# Patient Record
Sex: FEMALE | Race: WHITE | NOT HISPANIC OR LATINO | Employment: OTHER | URBAN - METROPOLITAN AREA
[De-identification: names, ages, dates, MRNs, and addresses within clinical notes are randomized per-mention and may not be internally consistent; named-entity substitution may affect disease eponyms.]

---

## 2017-01-18 ENCOUNTER — DOCUMENTATION (OUTPATIENT)
Dept: PHYSICAL THERAPY | Facility: CLINIC | Age: 77
End: 2017-01-18

## 2017-01-18 NOTE — PROGRESS NOTES
Discharge Summary  Discharge Summary from Physical Therapy Report      Dates  PT visit: 07/27/2016 to 10/6/2016  Number of Visits: 17     Discharge Status of Patient: Patient strength and function improved with PT.  She was still experiencing some discomfort but it was tolerable. MD discharged from PT    Goals: All Met    Discharge Plan: Continue with current home exercise program as instructed    Comments She should continue to improve with time.    Date of Discharge 11/30/2016        Luciano Louis, PT  Physical Therapist

## 2019-02-27 PROBLEM — J06.9 VIRAL URI WITH COUGH: Status: ACTIVE | Noted: 2019-02-27

## 2019-09-09 ENCOUNTER — TRANSCRIBE ORDERS (OUTPATIENT)
Dept: PHYSICAL THERAPY | Facility: CLINIC | Age: 79
End: 2019-09-09

## 2019-09-09 ENCOUNTER — OFFICE VISIT (OUTPATIENT)
Dept: PHYSICAL THERAPY | Facility: CLINIC | Age: 79
End: 2019-09-09

## 2019-09-09 DIAGNOSIS — M50.30 DDD (DEGENERATIVE DISC DISEASE), CERVICAL: Primary | ICD-10-CM

## 2019-09-09 DIAGNOSIS — M54.2 CERVICAL PAIN: Primary | ICD-10-CM

## 2019-09-09 PROCEDURE — 97140 MANUAL THERAPY 1/> REGIONS: CPT | Performed by: PHYSICAL THERAPIST

## 2019-09-09 PROCEDURE — G0283 ELEC STIM OTHER THAN WOUND: HCPCS | Performed by: PHYSICAL THERAPIST

## 2019-09-09 PROCEDURE — 97035 APP MDLTY 1+ULTRASOUND EA 15: CPT | Performed by: PHYSICAL THERAPIST

## 2019-09-09 PROCEDURE — 97162 PT EVAL MOD COMPLEX 30 MIN: CPT | Performed by: PHYSICAL THERAPIST

## 2019-09-09 NOTE — PROGRESS NOTES
Physical Therapy Initial Evaluation and Plan of Care        Patient: Maria Luisa Wilson   : 1940  Diagnosis/ICD-10 Code:  Cervical pain [M54.2]  Referring practitioner: No ref. provider found  Date of Initial Visit: 2019  Today's Date: 2019  Patient seen for 1 sessions           Subjective Evaluation    History of Present Illness  Mechanism of injury: About 1 year ago started experiencing right side neck pain, progressed to the left shoulder.  In the past couple months pain has progressed down the left UE.    Subjective comment: Neck pain with left UE radiculopathy  Patient Occupation: N/A Quality of life: good    Pain  Current pain ratin  At best pain ratin  At worst pain ratin  Location: Bilateral posterior cervical spine pain, radiating across the left upper trap to lateral arm.  The arm is more constant.  Limited cerivcal ROM in all planes of motion. No paraesthesia or anaesthesia.  Relieving factors: rest and support  Aggravating factors: movement, repetitive movement, sleeping and outstretched reach  Progression: worsening    Hand dominance: right    Diagnostic Tests  X-ray: abnormal (DDD)             Objective       Postural Observations  Seated posture: fair  Standing posture: fair    Additional Postural Observation Details  Forward head, rounded shoulder posture.    Tenderness   Cervical Spine   Tenderness in the facet joint, left transverse process and right transverse process.     Additional Tenderness Details  Hypomobility of all facets left > right.    Neurological Testing     Sensation   Cervical/Thoracic   Left   Intact: light touch, pin prick and sharp/dull discrimination    Right   Intact: light touch, pin prick and sharp/dull discrimination    Reflexes   Left   Biceps (C5/C6): normal (2+)  Brachioradialis (C6): normal (2+)  Triceps (C7): normal (2+)    Right   Biceps (C5/C6): normal (2+)  Brachioradialis (C6): normal (2+)  Triceps (C7): normal (2+)    Active Range of Motion    Cervical/Thoracic Spine   Cervical    Flexion: 58 degrees   Extension: 0 degrees with pain  Left lateral flexion: 18 degrees with pain  Right lateral flexion: 5 degrees with pain  Left rotation: 15 degrees with pain  Right rotation: 22 degrees with pain  Left Shoulder   Normal active range of motion    Right Shoulder   Normal active range of motion    Strength/Myotome Testing   Cervical Spine     Left   Normal strength    Right   Normal strength    Left Wrist/Hand      (2nd hand position)   lbs: 41    Right Wrist/Hand      (2nd hand position)   lbs: 30    Tests   Cervical     Left   Positive Spurling's sign (Neck pain only).   Negative brachial plexus traction.     Right   Positive Spurling's sign.   Negative brachial plexus traction.     Additional Tests Details  Cervical Compression=increases neck pain  Cervical Distraction=decreases pain         Assessment & Plan     Assessment  Impairments: abnormal or restricted ROM, activity intolerance and pain with function  Other impairment: Neck Disability score 42  Assessment details: 79 year old female with a 1 year history of neck pain and loss of ROM, worsening with time. Has radiating symptoms to left UE (above the elbow).  Neurological exam is normal today. Severe hypomobility of the facet joints left worse than right. Signs and symptoms of severe DDD/DJD of cervical spine. This negatively affecting her function and daily activities.  Prognosis: good  Prognosis details: Responded well to manual traction and modalities today.  Functional Limitations: uncomfortable because of pain and reaching overhead  Goals  Plan Goals: STG to be met in 4 weeks  1.  Neck disability score improves from 42 to 35.  2.  Cervical rotation ROM improves to 40 degrees bilaterally.  3.  Resolve posterior headaches.  LTG to be met in 12 weeks  1.  Pt is independent with HEP.  2.  Function improves so that Neck Disability score improves to 22 or less  3.  Pt is able to back car up  less discomfort.  4.  Resolve all radiating discomfort.    Plan  Therapy options: will be seen for skilled physical therapy services  Planned modality interventions: high voltage pulsed current (pain management), traction, ultrasound and thermotherapy (hydrocollator packs)  Other planned modality interventions: Dry Needling.  Planned therapy interventions: joint mobilization, functional ROM exercises, strengthening, stretching, therapeutic activities, spinal/joint mobilization, soft tissue mobilization, postural training, manual therapy and neuromuscular re-education  Frequency: 2x week  Duration in weeks: 12        Manual Therapy:    16     mins  67210;  Therapeutic Exercise:         mins  32935;     Neuromuscular Mily:        mins  84690;    Therapeutic Activity:          mins  67707;     Gait Training:           mins  94037;     Ultrasound:     13     mins  84241;    Electrical Stimulation:    20     mins  22499 ( );  Iontophoresis          mins 29995   Traction          mins  62185  Fluidotherapy          mins  08073  Dry Needling          mins self-pay  Traction          mins  64923    Timed Treatment:   29   mins   Total Treatment:     75   mins    PT SIGNATURE: Luciano Louis, PT   DATE TREATMENT INITIATED: 9/9/2019    Medicare Initial Certification  Certification Period: 12/8/2019  I certify that the therapy services are furnished while this patient is under my care.  The services outlined above are required by this patient, and will be reviewed every 90 days.     PHYSICIAN: _______________________________________________________     Girma Wilson MD        DATE: ___________________________________________________________    Please sign and return via fax to [phone].. Thank you, [facility].

## 2019-09-13 ENCOUNTER — OFFICE VISIT (OUTPATIENT)
Dept: PHYSICAL THERAPY | Facility: CLINIC | Age: 79
End: 2019-09-13

## 2019-09-13 DIAGNOSIS — M54.2 CERVICAL PAIN: Primary | ICD-10-CM

## 2019-09-13 PROCEDURE — G0283 ELEC STIM OTHER THAN WOUND: HCPCS | Performed by: PHYSICAL THERAPIST

## 2019-09-13 PROCEDURE — 97035 APP MDLTY 1+ULTRASOUND EA 15: CPT | Performed by: PHYSICAL THERAPIST

## 2019-09-13 PROCEDURE — 97140 MANUAL THERAPY 1/> REGIONS: CPT | Performed by: PHYSICAL THERAPIST

## 2019-09-16 NOTE — PROGRESS NOTES
Subjective   Maria Luisa Wilson reports: last treatment has moved the pain up the arm, stops about mid left humerus now, was going to the wrist. Left neck pain is not as bad, but still cannot move the neck much better.    Objective   PALPATION: severe hypomobility of all facets joint, tender on the left  TEST: cervical distraction decreases neck pain and left UE pain.  See Exercise, Manual, and Modality Logs for complete treatment.       Assessment/Plan  Cervical spondylosis worse to the left with radiating symptoms, improving with rehab.  Progress per Plan of Care           Manual Therapy:    18     mins  70739;  Therapeutic Exercise:         mins  41360;     Neuromuscular Mily:        mins  69377;    Therapeutic Activity:          mins  19849;     Gait Training:           mins  13320;     Ultrasound:     13     mins  77558;   Iontophoresis          mins  66342   Electrical Stimulation:    20     mins  73283 ( );  Dry Needling          mins self-pay  Fluidotherapy          mins 86110  Traction          mins 87257  Paraffin:          mins  18467    Timed Treatment:   31   mins   Total Treatment:     51   mins    Luciano Louis, PT  Physical Therapist

## 2019-09-17 ENCOUNTER — OFFICE VISIT (OUTPATIENT)
Dept: PHYSICAL THERAPY | Facility: CLINIC | Age: 79
End: 2019-09-17

## 2019-09-17 DIAGNOSIS — M54.2 CERVICAL PAIN: Primary | ICD-10-CM

## 2019-09-17 PROCEDURE — G0283 ELEC STIM OTHER THAN WOUND: HCPCS | Performed by: PHYSICAL THERAPIST

## 2019-09-17 PROCEDURE — 97035 APP MDLTY 1+ULTRASOUND EA 15: CPT | Performed by: PHYSICAL THERAPIST

## 2019-09-17 PROCEDURE — 97140 MANUAL THERAPY 1/> REGIONS: CPT | Performed by: PHYSICAL THERAPIST

## 2019-09-17 NOTE — PROGRESS NOTES
Physical Therapy Daily Progress Note        Patient: Maria Luisa Wilson   : 1940  Diagnosis/ICD-10 Code:  Cervical pain [M54.2]  Referring practitioner: Girma Wilson MD  Date of Initial Visit: Type: THERAPY  Noted: 2019  Today's Date: 2019  Patient seen for 3 sessions           Subjective   Maria Luisa Wilson reports: the left shoulder/upper arm only hurts now when she moves the shoulder, a grinding is in the shoulder.  Neck still hurts to turn it.    Objective   SHOULDER EXAM: impingement test +, RTC intact, tender at subacromial bursa  CERVICAL: left upper to lower cervical facets tender and hypomobile  See Exercise, Manual, and Modality Logs for complete treatment.       Assessment/Plan  Better ROM of cervical spine after treatment than prior.  Mild bursitis/tendinitis left shoulder, the pain radiating down arm at rest is probably coming from cervical spine. Pain with movement probably the shoulder joint.  Progress per Plan of Care and Progress strengthening /stabilization /functional activity           Manual Therapy:    16     mins  43167;  Therapeutic Exercise:         mins  19265;     Neuromuscular Mily:        mins  27755;    Therapeutic Activity:          mins  73729;     Gait Training:           mins  24756;     Ultrasound:     13     mins  42303;    Electrical Stimulation:    20     mins  51542 ( );  Iontophoresis          mins 13060   Traction          mins  90171  Fluidotherapy          mins  24318  Dry Needling          mins self-pay  Paraffin          mins  39590    Timed Treatment:   29   mins   Total Treatment:     49   mins    Luciano Louis, PT  Physical Therapist

## 2019-09-20 ENCOUNTER — TREATMENT (OUTPATIENT)
Dept: PHYSICAL THERAPY | Facility: CLINIC | Age: 79
End: 2019-09-20

## 2019-09-20 DIAGNOSIS — M54.2 CERVICAL PAIN: Primary | ICD-10-CM

## 2019-09-20 PROCEDURE — G0283 ELEC STIM OTHER THAN WOUND: HCPCS | Performed by: PHYSICAL THERAPIST

## 2019-09-20 PROCEDURE — 97140 MANUAL THERAPY 1/> REGIONS: CPT | Performed by: PHYSICAL THERAPIST

## 2019-09-20 PROCEDURE — 97035 APP MDLTY 1+ULTRASOUND EA 15: CPT | Performed by: PHYSICAL THERAPIST

## 2019-09-23 NOTE — PROGRESS NOTES
Physical Therapy Daily Progress Note        Patient: Maria Luisa Wilson   : 1940  Diagnosis/ICD-10 Code:  Cervical pain [M54.2]  Referring practitioner: Girma Wilson MD  Date of Initial Visit: Type: THERAPY  Noted: 2019  Today's Date: 2019  Patient seen for 4 sessions           Subjective   Maria Luisa Wilson reports: feels her arm is some better but the neck is only slightly better. States her pain was about 8/10 when she started PT, now it is 7/10    Objective   PALPATION: tender and hypomobile left cervical facets  CROM: SB left 20, right 10.  Rotation left 25, right 35    See Exercise, Manual, and Modality Logs for complete treatment.       Assessment/Plan  Some improvement in ROM, but all facets area extremely hypomobile.    Progress per Plan of Care           Manual Therapy:    24     mins  14589;  Therapeutic Exercise:         mins  77502;     Neuromuscular Mily:        mins  05572;    Therapeutic Activity:          mins  85711;     Gait Training:           mins  76679;     Ultrasound:     15     mins  02120;    Electrical Stimulation:    20     mins  26010 ( );  Iontophoresis          mins 46428   Traction          mins  50382  Fluidotherapy          mins  84160  Dry Needling          mins self-pay  Paraffin          mins  74503    Timed Treatment:   39   mins   Total Treatment:     59   mins    Luciano Louis PT  Physical Therapist

## 2019-09-25 ENCOUNTER — TREATMENT (OUTPATIENT)
Dept: PHYSICAL THERAPY | Facility: CLINIC | Age: 79
End: 2019-09-25

## 2019-09-25 DIAGNOSIS — M54.2 CERVICAL PAIN: Primary | ICD-10-CM

## 2019-09-25 PROCEDURE — 97140 MANUAL THERAPY 1/> REGIONS: CPT | Performed by: PHYSICAL THERAPIST

## 2019-09-25 PROCEDURE — 97035 APP MDLTY 1+ULTRASOUND EA 15: CPT | Performed by: PHYSICAL THERAPIST

## 2019-09-25 PROCEDURE — G0283 ELEC STIM OTHER THAN WOUND: HCPCS | Performed by: PHYSICAL THERAPIST

## 2019-09-27 NOTE — PROGRESS NOTES
Physical Therapy Daily Progress Note        Patient: Maria Luisa Wilson   : 1940  Diagnosis/ICD-10 Code:  Cervical pain [M54.2]  Referring practitioner: Girma Wilson MD  Date of Initial Visit: Type: THERAPY  Noted: 2019  Today's Date: 2019  Patient seen for 5 sessions           Subjective   Maria Luisa Wilson reports: last treatment helped a lot.  Less pain and can turn to the left better. Right now hurts more than the left, and feels more stiff to the right.    Objective   CROM:  Rotation left 40, right 42  PALPATION: tender bilateral paraspinal muscles and scalenes.  Hypomobility of all cervical facets  See Exercise, Manual, and Modality Logs for complete treatment.       Assessment/Plan  Pt responded very well to manual treatment including IDN.    Progress per Plan of Care           Manual Therapy:    26     mins  28866;  Therapeutic Exercise:         mins  28850;     Neuromuscular Mily:        mins  50174;    Therapeutic Activity:          mins  45163;     Gait Training:           mins  97601;     Ultrasound:     13     mins  24063;    Electrical Stimulation:    20     mins  82170 ( );  Iontophoresis          mins 63549   Traction          mins  22245  Fluidotherapy          mins  22202  Dry Needling          mins self-pay  Paraffin          mins  28661    Timed Treatment:   39   mins   Total Treatment:     59   mins    Luciano Louis, PT  Physical Therapist

## 2019-10-01 ENCOUNTER — TREATMENT (OUTPATIENT)
Dept: PHYSICAL THERAPY | Facility: CLINIC | Age: 79
End: 2019-10-01

## 2019-10-01 DIAGNOSIS — M54.2 CERVICAL PAIN: Primary | ICD-10-CM

## 2019-10-01 PROCEDURE — 97035 APP MDLTY 1+ULTRASOUND EA 15: CPT | Performed by: PHYSICAL THERAPIST

## 2019-10-01 PROCEDURE — 97140 MANUAL THERAPY 1/> REGIONS: CPT | Performed by: PHYSICAL THERAPIST

## 2019-10-01 PROCEDURE — G0283 ELEC STIM OTHER THAN WOUND: HCPCS | Performed by: PHYSICAL THERAPIST

## 2019-10-01 PROCEDURE — 97110 THERAPEUTIC EXERCISES: CPT | Performed by: PHYSICAL THERAPIST

## 2019-10-01 NOTE — PROGRESS NOTES
Physical Therapy Daily Progress Note        Patient: Maria Luisa Wilson   : 1940  Diagnosis/ICD-10 Code:  Cervical pain [M54.2]  Referring practitioner: Girma Wilson MD  Date of Initial Visit: Type: THERAPY  Noted: 2019  Today's Date: 10/1/2019  Patient seen for 6 sessions           Subjective   Maria Luisa Wilson reports: the IDN not have has dramatic effect on her as the 1st time did. But the neck is improving and she can tell she is rotating better. Left lower neck is more painful than the right side this week.    Objective   PALPATION: left and right lower cervical facets tender and painful  CROM: Rotation left 45, right 20-25 with ERP.    SHOULDER ROM: WNL  See Exercise, Manual, and Modality Logs for complete treatment.       Assessment/Plan  Suspect severe DDD and facet DJD, her ROM is improving. Pain is now less severe and intermittent. Improving with rehab.  Progress per Plan of Care and Progress strengthening /stabilization /functional activity           Manual Therapy:    25     mins  31098;  Therapeutic Exercise:    10     mins  25579;     Neuromuscular Mily:        mins  97744;    Therapeutic Activity:          mins  90053;     Gait Training:           mins  10596;     Ultrasound:     12     mins  45102;    Electrical Stimulation:    20     mins  08805 ( );  Iontophoresis          mins 35263   Traction          mins  99961  Fluidotherapy          mins  23904  Dry Needling          mins self-pay  Paraffin          mins  53405    Timed Treatment:   47   mins   Total Treatment:     67   mins    Luciano Louis, PT  Physical Therapist

## 2019-10-09 ENCOUNTER — TREATMENT (OUTPATIENT)
Dept: PHYSICAL THERAPY | Facility: CLINIC | Age: 79
End: 2019-10-09

## 2019-10-09 DIAGNOSIS — M54.2 CERVICAL PAIN: Primary | ICD-10-CM

## 2019-10-09 PROCEDURE — 97110 THERAPEUTIC EXERCISES: CPT | Performed by: PHYSICAL THERAPIST

## 2019-10-09 PROCEDURE — G0283 ELEC STIM OTHER THAN WOUND: HCPCS | Performed by: PHYSICAL THERAPIST

## 2019-10-09 PROCEDURE — 97140 MANUAL THERAPY 1/> REGIONS: CPT | Performed by: PHYSICAL THERAPIST

## 2019-10-09 PROCEDURE — 97035 APP MDLTY 1+ULTRASOUND EA 15: CPT | Performed by: PHYSICAL THERAPIST

## 2019-10-13 NOTE — PROGRESS NOTES
Subjective   Maria Luisa Wilson reports: PT is helping, can move her neck further and with less pain, more stiff to the right today.  May be getting an MRI soon.    Objective   CROM: Rotation left 46, right 30. Sidebending 15 degrees bilaterally.  PALPATION: tender bilateral cervical facets.  See Exercise, Manual, and Modality Logs for complete treatment.       Assessment/Plan  CROM is improving, pain is less.  She appears to be moving with less caution now.  Progress per Plan of Care and Progress strengthening /stabilization /functional activity           Manual Therapy:    17     mins  56264;  Therapeutic Exercise:    9     mins  02515;     Neuromuscular Mily:        mins  97897;    Therapeutic Activity:          mins  46756;     Gait Training:           mins  05926;     Ultrasound:     17     mins  53275;   Iontophoresis          mins  68923   Electrical Stimulation:    20     mins  58243 ( );  Dry Needling          mins self-pay  Fluidotherapy          mins 52872  Traction          mins 87545  Paraffin:          mins  68818    Timed Treatment:   43   mins   Total Treatment:     63   mins    Luciano Louis, PT  Physical Therapist

## 2019-12-09 ENCOUNTER — TREATMENT (OUTPATIENT)
Dept: PHYSICAL THERAPY | Facility: CLINIC | Age: 79
End: 2019-12-09

## 2019-12-09 ENCOUNTER — TRANSCRIBE ORDERS (OUTPATIENT)
Dept: PHYSICAL THERAPY | Facility: CLINIC | Age: 79
End: 2019-12-09

## 2019-12-09 DIAGNOSIS — T14.8XXA NERVE DAMAGE: ICD-10-CM

## 2019-12-09 DIAGNOSIS — M62.81 MUSCLE WEAKNESS: Primary | ICD-10-CM

## 2019-12-09 DIAGNOSIS — M54.2 CERVICAL PAIN: ICD-10-CM

## 2019-12-09 DIAGNOSIS — R53.81 DECLINING FUNCTIONAL STATUS: ICD-10-CM

## 2019-12-09 DIAGNOSIS — R53.1 GENERALIZED WEAKNESS: Primary | ICD-10-CM

## 2019-12-09 PROCEDURE — 97163 PT EVAL HIGH COMPLEX 45 MIN: CPT | Performed by: PHYSICAL THERAPIST

## 2019-12-09 PROCEDURE — 97140 MANUAL THERAPY 1/> REGIONS: CPT | Performed by: PHYSICAL THERAPIST

## 2019-12-09 NOTE — PROGRESS NOTES
Physical Therapy Initial Evaluation and Plan of Care        Patient: Maria Luisa Wilson   : 1940  Diagnosis/ICD-10 Code:  Generalized weakness [R53.1]  Referring practitioner: Girma Wilson MD  Date of Initial Visit: 2019  Today's Date: 2019  Patient seen for 1 sessions           Subjective Evaluation    History of Present Illness  Mechanism of injury: She has history of bilateral LEANN in  & , and lumbar laminectomy/fusion  L4/L5 due to spinal stenosis.  Since then she has had weakness of bilateral LEs.  She has a history of painful degenerative process of the cervical spine.    CURRENT COMPLAINTS  1. Bilateral LE weakness, with difficultly getting out of chairs, especially lower chair.  Squatting is very difficult, balance is poor at times. Cannot get up out of floor. It is very difficult to go up and down stairs.  2. Cervical Spine: Constant posterior headache, neck pain bilateral neck constant.  Neck is still with limited rotation. No UE in longer.      Patient Occupation: N/A Quality of life: good    Pain  Current pain ratin  At best pain ratin (Medication and complete rest)  At worst pain ratin    Social Support  Lives in: one-story house  Lives with: spouse    Hand dominance: right    Diagnostic Tests  X-ray: abnormal  MRI studies: abnormal    Treatments  Previous treatment: physical therapy           Objective       Static Posture     Head  Forward.    Lumbar Spine   Flattened.     Active Range of Motion   Cervical/Thoracic Spine   Cervical    Flexion: 40 degrees   Extension: 8 degrees   Left lateral flexion: 10 degrees with pain  Right lateral flexion: 5 degrees with pain  Left rotation: 25 degrees with pain  Right rotation: 30 degrees with pain  Left Shoulder   Normal active range of motion    Right Shoulder   Normal active range of motion    Lumbar   Flexion: 78 degrees   Extension: 15 degrees   Left lateral flexion: 20 degrees   Right lateral flexion: 18 degrees      Strength/Myotome Testing   Cervical Spine   Neck extension: 4  Neck flexion: 4    Left   Normal strength    Right   Normal strength    Left Hip   Planes of Motion   Flexion: 3-  Extension: 3+  Abduction: 2+    Right Hip   Planes of Motion   Flexion: 3-  Extension: 3-  Abduction: 3    Left Knee   Flexion: 4+  Extension: 4+    Right Knee   Flexion: 4+  Extension: 4+    Left Ankle/Foot   Dorsiflexion: 4-  Eversion: 4-  Great toe flexion: 4    Right Ankle/Foot   Dorsiflexion: 4+  Eversion: 4+  Great toe flexion: 5    Ambulation   Weight-Bearing Status   Weight-Bearing Status (Left): full weight bearing   Weight-Bearing Status (Right): full weight-bearing    Assistive device used: none    Ambulation: Level Surfaces   Ambulation without assistive device: independent    Ambulation: Stairs   Ascend stairs: independent  Pattern: reciprocal  Railings: one rail  Descend stairs: independent  Pattern: reciprocal  Railings: one rail  Curbs: independent    Observational Gait   Decreased walking speed and stride length.     Quality of Movement During Gait   Trunk  Extensor lurch.     Functional Assessment     Single Leg Stance   Left: 1 seconds  Right: 1 seconds    Comments  Unable to squat or lunge.         Assessment & Plan     Assessment  Impairments: abnormal gait, abnormal muscle firing, activity intolerance, impaired balance and impaired physical strength  Other impairment: Neck Disability Index=23, LEFS=35/80  Assessment details: 79 year old female with a long history of degenerative pathologies of cervical and lumbar spine, has underwent a L4/L5 fusion related to spinal stenosis.  Slightly abnormal neurological exam of LEs, sluggish knee reflex.  She presents with significant weakness of bilateral hip and thigh muscles which is contributing to poor balance, inability to get out of floor or low chairs, stair climbing is difficult.    Cervical spine is very pain and has significant loss of ROM with cervical head aches.   Neurological exam is normal in UE.  Prognosis: good  Functional Limitations: carrying objects, lifting, walking, standing and stooping  Goals  Plan Goals: STG to be met in 4 weeks.  1.  Pt has 20 degree improvement in cervical rotation to assist in backing vehicle.  2. Resolve posterior headache.  3. Pt has improved LE strength so that LEFS score improves to 45/80.  4. Neck pain decreases to 5/10 at its worst.  LTG to be met in 12 weeks.  1.  Pt is independent and consistent with HEP.  2.  Pt LE function improves so that LEFS score improves to 58/80.  3.  Neck pain and function improves so NDI score improves to 15 or less.  4.  Pt can get herself out of the floor independently.    Plan  Therapy options: will be seen for skilled physical therapy services  Planned modality interventions: ultrasound, traction, high voltage pulsed current (pain management) and electrical stimulation/Russian stimulation  Planned therapy interventions: abdominal trunk stabilization, balance/weight-bearing training, body mechanics training, home exercise program, joint mobilization, therapeutic activities, stretching, strengthening, transfer training, spinal/joint mobilization, soft tissue mobilization, postural training, neuromuscular re-education and manual therapy  Frequency: 2x week  Duration in weeks: 12        Manual Therapy:    16     mins  58031;      Timed Treatment:   16   mins   Total Treatment:     64   mins    PT SIGNATURE: Luciano Louis, PT   DATE TREATMENT INITIATED: 12/9/2019    Medicare Initial Certification  Certification Period: 3/8/2020  I certify that the therapy services are furnished while this patient is under my care.  The services outlined above are required by this patient, and will be reviewed every 90 days.     PHYSICIAN: ______________________________________________________      Girma Wilson MD        DATE: ____________________________________________________________    Please sign and return via fax  to [phone].. Thank you, [facility].

## 2019-12-11 ENCOUNTER — TREATMENT (OUTPATIENT)
Dept: PHYSICAL THERAPY | Facility: CLINIC | Age: 79
End: 2019-12-11

## 2019-12-11 DIAGNOSIS — R53.1 GENERALIZED WEAKNESS: Primary | ICD-10-CM

## 2019-12-11 DIAGNOSIS — M54.2 CERVICAL PAIN: ICD-10-CM

## 2019-12-11 PROCEDURE — 97140 MANUAL THERAPY 1/> REGIONS: CPT | Performed by: PHYSICAL THERAPIST

## 2019-12-11 PROCEDURE — 97110 THERAPEUTIC EXERCISES: CPT | Performed by: PHYSICAL THERAPIST

## 2019-12-15 NOTE — PROGRESS NOTES
Physical Therapy Daily Progress Note        Patient: Maria Luisa Wilson   : 1940  Diagnosis/ICD-10 Code:  Generalized weakness [R53.1]  Referring practitioner: Girma Wilson MD  Date of Initial Visit: Type: THERAPY  Noted: 2019    Type: THERAPY  Noted: 2019  Today's Date: 2019  Patient seen for 9 sessions           Subjective   Maria Luisa Wilson reports: last treatment took away head ache, neck still hurts, and legs are still very weak.    Objective   CROM: limited bilaterally with rotation and side bending with ERP.  TRANSFERS: needs assistance with sit to stand when hips are lower than knees.  See Exercise, Manual, and Modality Logs for complete treatment.       Assessment/Plan  LE weakness is significant.  CROM was slightly improves over initial evaluation.  Strength progress will be very slow.  Progress per Plan of Care and Progress strengthening /stabilization /functional activity           Manual Therapy:    13     mins  25717;  Therapeutic Exercise:    41     mins  34713;     Neuromuscular Mily:        mins  39096;    Therapeutic Activity:          mins  15301;     Gait Training:           mins  08067;     Ultrasound:          mins  71380;    Electrical Stimulation:         mins  58720 ( );  Iontophoresis          mins 43552   Traction          mins  16956  Fluidotherapy          mins  45546  Dry Needling          mins self-pay  Paraffin          mins  64449    Timed Treatment:   54   mins   Total Treatment:     54   mins    Luciano Louis, PT  Physical Therapist

## 2019-12-17 ENCOUNTER — TREATMENT (OUTPATIENT)
Dept: PHYSICAL THERAPY | Facility: CLINIC | Age: 79
End: 2019-12-17

## 2019-12-17 DIAGNOSIS — M54.2 CERVICAL PAIN: ICD-10-CM

## 2019-12-17 DIAGNOSIS — R53.1 GENERALIZED WEAKNESS: Primary | ICD-10-CM

## 2019-12-17 PROCEDURE — 97140 MANUAL THERAPY 1/> REGIONS: CPT | Performed by: PHYSICAL THERAPIST

## 2019-12-17 PROCEDURE — 97110 THERAPEUTIC EXERCISES: CPT | Performed by: PHYSICAL THERAPIST

## 2019-12-20 ENCOUNTER — TREATMENT (OUTPATIENT)
Dept: PHYSICAL THERAPY | Facility: CLINIC | Age: 79
End: 2019-12-20

## 2019-12-20 DIAGNOSIS — R53.1 GENERALIZED WEAKNESS: Primary | ICD-10-CM

## 2019-12-20 DIAGNOSIS — M54.2 CERVICAL PAIN: ICD-10-CM

## 2019-12-20 PROCEDURE — 97110 THERAPEUTIC EXERCISES: CPT | Performed by: PHYSICAL THERAPIST

## 2019-12-20 PROCEDURE — 97035 APP MDLTY 1+ULTRASOUND EA 15: CPT | Performed by: PHYSICAL THERAPIST

## 2019-12-20 NOTE — PROGRESS NOTES
Physical Therapy Daily Progress Note        Patient: Maria Luisa Wilson   : 1940  Diagnosis/ICD-10 Code:  Generalized weakness [R53.1]  Referring practitioner: Girma Wilson MD  Date of Initial Visit: Type: THERAPY  Noted: 2019    Type: THERAPY  Noted: 2019  Today's Date: 2019  Patient seen for 10 sessions           Subjective   Maria Luisa Wilson reports: Headache a lot better, neck still hurts but is moving better. Legs still very weak.    Objective   CROM: Lateral flexion 10 degrees bilaterally, rotation right 56 degrees left 45 degrees.  STRENGTH:  Sit to stand fatigues quickly and needs min assist, functionally LE is 3+/5.  See Exercise, Manual, and Modality Logs for complete treatment.       Assessment/Plan  Slight improvement in LE strength and CROM.  Responding positively to rehab.  Progress per Plan of Care and Progress strengthening /stabilization /functional activity           Manual Therapy:    14     mins  78631;  Therapeutic Exercise:    48     mins  04479;     Neuromuscular Mily:        mins  03285;    Therapeutic Activity:          mins  71665;     Gait Training:           mins  19306;     Ultrasound:          mins  55519;    Electrical Stimulation:         mins  14987 ( );  Iontophoresis          mins 58590   Traction          mins  08992  Fluidotherapy          mins  41398  Dry Needling          mins self-pay  Paraffin          mins  30803    Timed Treatment:   62   mins   Total Treatment:     62   mins    Luciano Louis, PT  Physical Therapist

## 2019-12-23 ENCOUNTER — TREATMENT (OUTPATIENT)
Dept: PHYSICAL THERAPY | Facility: CLINIC | Age: 79
End: 2019-12-23

## 2019-12-23 DIAGNOSIS — M54.2 CERVICAL PAIN: Primary | ICD-10-CM

## 2019-12-23 PROCEDURE — 97035 APP MDLTY 1+ULTRASOUND EA 15: CPT | Performed by: PHYSICAL THERAPIST

## 2019-12-23 PROCEDURE — 97140 MANUAL THERAPY 1/> REGIONS: CPT | Performed by: PHYSICAL THERAPIST

## 2019-12-23 PROCEDURE — G0283 ELEC STIM OTHER THAN WOUND: HCPCS | Performed by: PHYSICAL THERAPIST

## 2019-12-23 NOTE — PROGRESS NOTES
Physical Therapy Daily Progress Note        Patient: Maria Luisa Wilson   : 1940  Diagnosis/ICD-10 Code:  Generalized weakness [R53.1]  Referring practitioner: Girma Wislon MD  Date of Initial Visit: Type: THERAPY  Noted: 2019    Type: THERAPY  Noted: 2019  Today's Date: 2019  Patient seen for 11 sessions           Subjective   Maria Luisa Wilson reports: no headache, neck is moving some better. Pain still about 7/10 at times left side of neck. Getting out of chairs is slightly easier.    Objective   FUNCTIONAL STRENGTH: Able to go sit to stand 5 x 3 sets with short rest and no assistance today.  See Exercise, Manual, and Modality Logs for complete treatment.       Assessment/Plan  Cervical is probably DDD and DJD of cervical spine, ROM is improving and physically appears to be in less pain. Slow improvement in LE strength  Progress per Plan of Care and Progress strengthening /stabilization /functional activity           Manual Therapy:         mins  57870;  Therapeutic Exercise:    48     mins  88035;     Neuromuscular Mily:        mins  65070;    Therapeutic Activity:          mins  73194;     Gait Training:           mins  98186;     Ultrasound:     13     mins  91950;    Electrical Stimulation:         mins  93412 ( );  Iontophoresis          mins 30344   Traction          mins  58048  Fluidotherapy          mins  58489  Dry Needling          mins self-pay  Paraffin          mins  24994    Timed Treatment:   61   mins   Total Treatment:     61   mins    Luciano Louis, PT  Physical Therapist

## 2019-12-25 NOTE — PROGRESS NOTES
Physical Therapy Daily Progress Note        Patient: Maria Luisa Wilson   : 1940  Diagnosis/ICD-10 Code:  Cervical pain [M54.2]  Referring practitioner: Girma Wilson MD  Date of Initial Visit: Type: THERAPY  Noted: 2019  Today's Date: 2019  Patient seen for 12 sessions           Subjective   Maria Luisa Wilson reports: neck really hurts today and has headache throughout the head.  Wants to work on neck only today. Pain 7/10 today    Objective   PALPATION: tender left>right cervical facets/paraspinals and scalene muscles.  CROM:Rotation; right 60 degree, left 42 degrees. Lateral flexion right 15, left 12 degrees.    See Exercise, Manual, and Modality Logs for complete treatment.       Assessment/Plan  Pt was more tender and restricted in ROM compared to last week.  Less pain in neck and decreased in HA intensity upon leaving.  Progress per Plan of Care           Manual Therapy:    17     mins  19032;  Therapeutic Exercise:         mins  04974;     Neuromuscular Mily:        mins  83700;    Therapeutic Activity:          mins  55386;     Gait Training:           mins  44199;     Ultrasound:     13     mins  55125;    Electrical Stimulation:    20     mins  97083 ( );  Iontophoresis         mins 85221   Traction          mins  68802  Fluidotherapy          mins  17299  Dry Needling          mins self-pay  Paraffin          mins  65864    Timed Treatment:   30   mins   Total Treatment:     50   mins    Luciano Louis, PT  Physical Therapist

## 2020-12-15 ENCOUNTER — APPOINTMENT (OUTPATIENT)
Dept: CT IMAGING | Facility: HOSPITAL | Age: 80
End: 2020-12-15

## 2020-12-15 ENCOUNTER — APPOINTMENT (OUTPATIENT)
Dept: GENERAL RADIOLOGY | Facility: HOSPITAL | Age: 80
End: 2020-12-15

## 2020-12-15 ENCOUNTER — HOSPITAL ENCOUNTER (EMERGENCY)
Facility: HOSPITAL | Age: 80
Discharge: HOME | End: 2020-12-15
Attending: EMERGENCY MEDICINE | Admitting: EMERGENCY MEDICINE

## 2020-12-15 VITALS
SYSTOLIC BLOOD PRESSURE: 191 MMHG | RESPIRATION RATE: 16 BRPM | BODY MASS INDEX: 23.95 KG/M2 | OXYGEN SATURATION: 99 % | HEIGHT: 68 IN | DIASTOLIC BLOOD PRESSURE: 87 MMHG | TEMPERATURE: 96.4 F | HEART RATE: 70 BPM | WEIGHT: 158 LBS
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
## 2020-12-15 DIAGNOSIS — S00.12XA CONTUSION OF LEFT PERIOCULAR REGION, INITIAL ENCOUNTER: ICD-10-CM

## 2020-12-15 DIAGNOSIS — S01.112A LACERATION OF LEFT EYEBROW, INITIAL ENCOUNTER: ICD-10-CM

## 2020-12-15 DIAGNOSIS — S80.02XA CONTUSION OF LEFT KNEE, INITIAL ENCOUNTER: ICD-10-CM

## 2020-12-15 LAB
ALBUMIN SERPL-MCNC: 4.4 G/DL (ref 3.5–5.2)
ALBUMIN/GLOB SERPL: 1.5 G/DL
ALP SERPL-CCNC: 74 U/L (ref 39–117)
ALT SERPL W P-5'-P-CCNC: 27 U/L (ref 1–33)
ANION GAP SERPL CALCULATED.3IONS-SCNC: 10 MMOL/L (ref 5–15)
AST SERPL-CCNC: 31 U/L (ref 1–32)
BASOPHILS # BLD AUTO: 0.02 10*3/MM3 (ref 0–0.2)
BASOPHILS NFR BLD AUTO: 0.3 % (ref 0–1.5)
BILIRUB SERPL-MCNC: 0.2 MG/DL (ref 0–1.2)
BUN SERPL-MCNC: 21 MG/DL (ref 8–23)
BUN/CREAT SERPL: 30 (ref 7–25)
CALCIUM SPEC-SCNC: 9.6 MG/DL (ref 8.6–10.5)
CHLORIDE SERPL-SCNC: 103 MMOL/L (ref 98–107)
CO2 SERPL-SCNC: 28 MMOL/L (ref 22–29)
CREAT SERPL-MCNC: 0.7 MG/DL (ref 0.57–1)
DEPRECATED RDW RBC AUTO: 41.1 FL (ref 37–54)
EOSINOPHIL # BLD AUTO: 0.11 10*3/MM3 (ref 0–0.4)
EOSINOPHIL NFR BLD AUTO: 1.9 % (ref 0.3–6.2)
ERYTHROCYTE [DISTWIDTH] IN BLOOD BY AUTOMATED COUNT: 12.5 % (ref 12.3–15.4)
GFR SERPL CREATININE-BSD FRML MDRD: 81 ML/MIN/1.73
GLOBULIN UR ELPH-MCNC: 3 GM/DL
GLUCOSE SERPL-MCNC: 105 MG/DL (ref 65–99)
HCT VFR BLD AUTO: 40.9 % (ref 34–46.6)
HGB BLD-MCNC: 13.4 G/DL (ref 12–15.9)
HOLD SPECIMEN: NORMAL
HOLD SPECIMEN: NORMAL
IMM GRANULOCYTES # BLD AUTO: 0.01 10*3/MM3 (ref 0–0.05)
IMM GRANULOCYTES NFR BLD AUTO: 0.2 % (ref 0–0.5)
LYMPHOCYTES # BLD AUTO: 1.8 10*3/MM3 (ref 0.7–3.1)
LYMPHOCYTES NFR BLD AUTO: 30.6 % (ref 19.6–45.3)
MAGNESIUM SERPL-MCNC: 2.1 MG/DL (ref 1.6–2.4)
MCH RBC QN AUTO: 29.3 PG (ref 26.6–33)
MCHC RBC AUTO-ENTMCNC: 32.8 G/DL (ref 31.5–35.7)
MCV RBC AUTO: 89.5 FL (ref 79–97)
MONOCYTES # BLD AUTO: 0.7 10*3/MM3 (ref 0.1–0.9)
MONOCYTES NFR BLD AUTO: 11.9 % (ref 5–12)
NEUTROPHILS NFR BLD AUTO: 3.25 10*3/MM3 (ref 1.7–7)
NEUTROPHILS NFR BLD AUTO: 55.1 % (ref 42.7–76)
NRBC BLD AUTO-RTO: 0 /100 WBC (ref 0–0.2)
PLATELET # BLD AUTO: 239 10*3/MM3 (ref 140–450)
PMV BLD AUTO: 8.9 FL (ref 6–12)
POTASSIUM SERPL-SCNC: 4.1 MMOL/L (ref 3.5–5.2)
PROT SERPL-MCNC: 7.4 G/DL (ref 6–8.5)
RBC # BLD AUTO: 4.57 10*6/MM3 (ref 3.77–5.28)
SODIUM SERPL-SCNC: 141 MMOL/L (ref 136–145)
TROPONIN T SERPL-MCNC: <0.01 NG/ML (ref 0–0.03)
WBC # BLD AUTO: 5.89 10*3/MM3 (ref 3.4–10.8)
WHOLE BLOOD HOLD SPECIMEN: NORMAL
WHOLE BLOOD HOLD SPECIMEN: NORMAL

## 2020-12-15 PROCEDURE — 84484 ASSAY OF TROPONIN QUANT: CPT | Performed by: EMERGENCY MEDICINE

## 2020-12-15 PROCEDURE — 90715 TDAP VACCINE 7 YRS/> IM: CPT | Performed by: PHYSICIAN ASSISTANT

## 2020-12-15 PROCEDURE — 80053 COMPREHEN METABOLIC PANEL: CPT | Performed by: EMERGENCY MEDICINE

## 2020-12-15 PROCEDURE — 73560 X-RAY EXAM OF KNEE 1 OR 2: CPT

## 2020-12-15 PROCEDURE — 83735 ASSAY OF MAGNESIUM: CPT | Performed by: EMERGENCY MEDICINE

## 2020-12-15 PROCEDURE — 90471 IMMUNIZATION ADMIN: CPT | Performed by: PHYSICIAN ASSISTANT

## 2020-12-15 PROCEDURE — 25010000002 TDAP 5-2.5-18.5 LF-MCG/0.5 SUSPENSION: Performed by: PHYSICIAN ASSISTANT

## 2020-12-15 PROCEDURE — 93005 ELECTROCARDIOGRAM TRACING: CPT

## 2020-12-15 PROCEDURE — 99283 EMERGENCY DEPT VISIT LOW MDM: CPT

## 2020-12-15 PROCEDURE — 70450 CT HEAD/BRAIN W/O DYE: CPT

## 2020-12-15 PROCEDURE — 85025 COMPLETE CBC W/AUTO DIFF WBC: CPT | Performed by: EMERGENCY MEDICINE

## 2020-12-15 PROCEDURE — 93005 ELECTROCARDIOGRAM TRACING: CPT | Performed by: EMERGENCY MEDICINE

## 2020-12-15 RX ORDER — BACITRACIN, NEOMYCIN, POLYMYXIN B 400; 3.5; 5 [USP'U]/G; MG/G; [USP'U]/G
1 OINTMENT TOPICAL ONCE
Status: COMPLETED | OUTPATIENT
Start: 2020-12-15 | End: 2020-12-15

## 2020-12-15 RX ORDER — LIDOCAINE HYDROCHLORIDE AND EPINEPHRINE 10; 10 MG/ML; UG/ML
10 INJECTION, SOLUTION INFILTRATION; PERINEURAL ONCE
Status: COMPLETED | OUTPATIENT
Start: 2020-12-15 | End: 2020-12-15

## 2020-12-15 RX ORDER — SODIUM CHLORIDE 0.9 % (FLUSH) 0.9 %
10 SYRINGE (ML) INJECTION AS NEEDED
Status: DISCONTINUED | OUTPATIENT
Start: 2020-12-15 | End: 2020-12-15 | Stop reason: HOSPADM

## 2020-12-15 RX ADMIN — TETANUS TOXOID, REDUCED DIPHTHERIA TOXOID AND ACELLULAR PERTUSSIS VACCINE, ADSORBED 0.5 ML: 5; 2.5; 8; 8; 2.5 SUSPENSION INTRAMUSCULAR at 18:25

## 2020-12-15 RX ADMIN — LIDOCAINE HYDROCHLORIDE,EPINEPHRINE BITARTRATE 10 ML: 10; .01 INJECTION, SOLUTION INFILTRATION; PERINEURAL at 19:33

## 2020-12-15 RX ADMIN — BACITRACIN, NEOMYCIN, POLYMYXIN B 1 APPLICATION: 400; 3.5; 5 OINTMENT TOPICAL at 19:33

## 2020-12-15 NOTE — DISCHARGE INSTRUCTIONS
Symptomatic care is recommended.  Take all of your medications as prescribed and instructed.  Follow-up for suture removal in 5 to 7 days.  Return to ED with worsening of symptoms.

## 2020-12-15 NOTE — ED PROVIDER NOTES
Subjective   Patient is a 80-year-old female who presents the emergency room for evaluation following a fall.  Patient states that she was shopping at [employer] and was taking her packages to her car when she fell hitting her head on the concrete.  She did not lose consciousness.  She reports after the incident she sat up and was able to ambulate to the car.  She presented to the urgent treatment center in [facility] for evaluation who recommended she would need further evaluation and imaging of her head and that she should present to the emergency department.  Patient reports that it was a mechanical fall.  She did not experience any symptoms prior to the fall.  Patient currently is not taking any blood thinners.  She reports some pain in her head and an episode of feeling nauseous in addition to pain in her left knee from the fall.  Reports no additional associated symptoms.          Review of Systems   Constitutional: Negative for chills and fever.   Eyes: Negative for visual disturbance.   Respiratory: Negative for shortness of breath.    Cardiovascular: Negative for chest pain.   Gastrointestinal: Negative for abdominal pain.   Musculoskeletal: Positive for joint swelling. Negative for neck pain.   Skin: Positive for wound.   Neurological: Positive for headaches. Negative for dizziness.   All other systems reviewed and are negative.      Past Medical History:   Diagnosis Date   • Arthritis    • Cataract    • Injury of back        No Known Allergies    History reviewed. No pertinent surgical history.    History reviewed. No pertinent family history.    Social History     Socioeconomic History   • Marital status:      Spouse name: Not on file   • Number of children: Not on file   • Years of education: Not on file   • Highest education level: Not on file   Tobacco Use   • Smoking status: Never Smoker   • Smokeless tobacco: Never Used   Substance and Sexual Activity   • Alcohol use: No   • Drug  use: No   • Sexual activity: Defer           Objective   Physical Exam  Vitals signs and nursing note reviewed.   Constitutional:       General: She is not in acute distress.     Appearance: She is well-developed.   HENT:      Head: Contusion present.        Comments: 2 cm laceration to left eyebrow, bleeding controlled.     Nose: Nose normal.      Mouth/Throat:      Mouth: Mucous membranes are moist.   Eyes:      Extraocular Movements: Extraocular movements intact.      Conjunctiva/sclera: Conjunctivae normal.      Pupils: Pupils are equal, round, and reactive to light.   Neck:      Musculoskeletal: Normal range of motion and neck supple. No muscular tenderness.   Cardiovascular:      Rate and Rhythm: Normal rate and regular rhythm.      Pulses: Normal pulses.   Pulmonary:      Effort: Pulmonary effort is normal. No respiratory distress.      Breath sounds: Normal breath sounds.   Abdominal:      General: There is no distension.      Palpations: Abdomen is soft.      Tenderness: There is no abdominal tenderness.   Musculoskeletal: Normal range of motion.      Left knee: She exhibits swelling. Tenderness found.   Skin:     General: Skin is warm and dry.   Neurological:      General: No focal deficit present.      Mental Status: She is alert and oriented to person, place, and time.      Sensory: Sensory deficit present.   Psychiatric:         Mood and Affect: Mood normal.         Behavior: Behavior normal.         Thought Content: Thought content normal.         Judgment: Judgment normal.         Laceration Repair    Date/Time: 12/15/2020 6:55 PM  Performed by: Alton Wong PA  Authorized by: Ayden Reilly MD     Consent:     Consent obtained:  Verbal    Consent given by:  Patient    Risks discussed:  Poor cosmetic result and poor wound healing    Alternatives discussed:  Delayed treatment and observation  Anesthesia (see MAR for exact dosages):     Anesthesia method:  Local infiltration    Local  anesthetic:  Lidocaine 1% WITH epi  Laceration details:     Location:  Face    Face location:  L eyebrow    Length (cm):  2    Depth (mm):  1  Pre-procedure details:     Preparation:  Patient was prepped and draped in usual sterile fashion and imaging obtained to evaluate for foreign bodies  Exploration:     Hemostasis achieved with:  Epinephrine and direct pressure    Wound extent: no muscle damage noted, no nerve damage noted, no underlying fracture noted and no vascular damage noted      Contaminated: no    Treatment:     Area cleansed with:  Hibiclens and saline    Amount of cleaning:  Standard    Irrigation solution:  Sterile saline    Irrigation method:  Syringe  Skin repair:     Repair method:  Sutures    Suture size:  5-0    Suture material:  Nylon    Suture technique:  Simple interrupted    Number of sutures:  4  Approximation:     Approximation:  Close  Post-procedure details:     Dressing:  Antibiotic ointment and sterile dressing    Patient tolerance of procedure:  Tolerated well, no immediate complications               ED Course  ED Course as of Dec 15 2000   Tue Dec 15, 2020   1956 Patient presents to ED for evaluation following a fall.  Laceration with fusion above left eyebrow, left knee pain and swelling without additional acute abnormalities on physical exam.  Patient alert and oriented, neurovascularly intact.  Negative CT scan of the head.  Left knee without evidence of acute fractures or dislocations.  Sinus rhythm on EKG.  No additional acute abnormalities on labs.  Patient reports a mechanical fall without loss of consciousness.  Is unaware of last tetanus, given while in the emergency department today.  Laceration above left eyebrow repaired as documented in chart.  Patient discharged with continued outpatient follow-up to primary care for suture removal, given clear return precautions and showed understanding.    [JG]      ED Course User Index  [JG] Alton Wong PA      Recent Results  (from the past 24 hour(s))   Comprehensive Metabolic Panel    Collection Time: 12/15/20  5:18 PM    Specimen: Blood   Result Value Ref Range    Glucose 105 (H) 65 - 99 mg/dL    BUN 21 8 - 23 mg/dL    Creatinine 0.70 0.57 - 1.00 mg/dL    Sodium 141 136 - 145 mmol/L    Potassium 4.1 3.5 - 5.2 mmol/L    Chloride 103 98 - 107 mmol/L    CO2 28.0 22.0 - 29.0 mmol/L    Calcium 9.6 8.6 - 10.5 mg/dL    Total Protein 7.4 6.0 - 8.5 g/dL    Albumin 4.40 3.50 - 5.20 g/dL    ALT (SGPT) 27 1 - 33 U/L    AST (SGOT) 31 1 - 32 U/L    Alkaline Phosphatase 74 39 - 117 U/L    Total Bilirubin 0.2 0.0 - 1.2 mg/dL    eGFR Non African Amer 81 >60 mL/min/1.73    Globulin 3.0 gm/dL    A/G Ratio 1.5 g/dL    BUN/Creatinine Ratio 30.0 (H) 7.0 - 25.0    Anion Gap 10.0 5.0 - 15.0 mmol/L   Magnesium    Collection Time: 12/15/20  5:18 PM    Specimen: Blood   Result Value Ref Range    Magnesium 2.1 1.6 - 2.4 mg/dL   Troponin    Collection Time: 12/15/20  5:18 PM    Specimen: Blood   Result Value Ref Range    Troponin T <0.010 0.000 - 0.030 ng/mL   Light Blue Top    Collection Time: 12/15/20  5:18 PM   Result Value Ref Range    Extra Tube hold for add-on    Green Top (Gel)    Collection Time: 12/15/20  5:18 PM   Result Value Ref Range    Extra Tube Hold for add-ons.    Lavender Top    Collection Time: 12/15/20  5:18 PM   Result Value Ref Range    Extra Tube hold for add-on    Gold Top - SST    Collection Time: 12/15/20  5:18 PM   Result Value Ref Range    Extra Tube Hold for add-ons.    CBC Auto Differential    Collection Time: 12/15/20  5:18 PM    Specimen: Blood   Result Value Ref Range    WBC 5.89 3.40 - 10.80 10*3/mm3    RBC 4.57 3.77 - 5.28 10*6/mm3    Hemoglobin 13.4 12.0 - 15.9 g/dL    Hematocrit 40.9 34.0 - 46.6 %    MCV 89.5 79.0 - 97.0 fL    MCH 29.3 26.6 - 33.0 pg    MCHC 32.8 31.5 - 35.7 g/dL    RDW 12.5 12.3 - 15.4 %    RDW-SD 41.1 37.0 - 54.0 fl    MPV 8.9 6.0 - 12.0 fL    Platelets 239 140 - 450 10*3/mm3    Neutrophil % 55.1 42.7 - 76.0  "%    Lymphocyte % 30.6 19.6 - 45.3 %    Monocyte % 11.9 5.0 - 12.0 %    Eosinophil % 1.9 0.3 - 6.2 %    Basophil % 0.3 0.0 - 1.5 %    Immature Grans % 0.2 0.0 - 0.5 %    Neutrophils, Absolute 3.25 1.70 - 7.00 10*3/mm3    Lymphocytes, Absolute 1.80 0.70 - 3.10 10*3/mm3    Monocytes, Absolute 0.70 0.10 - 0.90 10*3/mm3    Eosinophils, Absolute 0.11 0.00 - 0.40 10*3/mm3    Basophils, Absolute 0.02 0.00 - 0.20 10*3/mm3    Immature Grans, Absolute 0.01 0.00 - 0.05 10*3/mm3    nRBC 0.0 0.0 - 0.2 /100 WBC     Note: In addition to lab results from this visit, the labs listed above may include labs taken at another facility or during a different encounter within the last 24 hours. Please correlate lab times with ED admission and discharge times for further clarification of the services performed during this visit.    XR Knee 1 or 2 View Left   Preliminary Result   No acute osseous findings of the left knee or knee effusion              CT Head Without Contrast   Preliminary Result   No acute intracranial findings. Partially visualized   calvarial and facial bone structures unremarkable for acute fracture or   calvarial defect.                Vitals:    12/15/20 1708 12/15/20 1730   BP: (!) 209/86 (!) 191/87   BP Location: Left arm    Patient Position: Sitting    Pulse: 74 70   Resp: 16    Temp: 96.4 °F (35.8 °C)    TempSrc: Infrared    SpO2: 97% 99%   Weight: 71.7 kg (158 lb)    Height: 172.7 cm (68\")      Medications   sodium chloride 0.9 % flush 10 mL (has no administration in time range)   Tdap (BOOSTRIX) injection 0.5 mL (0.5 mL Intramuscular Given 12/15/20 1825)   lidocaine-EPINEPHrine (XYLOCAINE W/EPI) 1 %-1:259374 injection 10 mL (10 mL Injection Given 12/15/20 1933)   neomycin-bacitracin-polymyxin (NEOSPORIN) ointment 1 application (1 application Topical Given 12/15/20 1933)     ECG/EMG Results (last 24 hours)     Procedure Component Value Units Date/Time    ECG 12 Lead [[id number]] Collected: 12/15/20 5905     " Updated: 12/15/20 1722        ECG 12 Lead                DISCHARGE    Patient discharged in stable condition.    Reviewed implications of results, diagnosis, meds, responsibility to follow up, warning signs and symptoms of possible worsening, potential complications and reasons to return to ER.    Patient/Family voiced understanding of above instructions.    Discussed plan for discharge, as there is no emergent indication for admission.  Pt/family is agreeable and understands need for follow up and possible repeat testing.  Pt/family is aware that discharge does not mean that nothing is wrong but that it indicates no emergency is currently present that requires admission and they must continue care with follow-up as given below or with a physician of their choice.     FOLLOW-UP  Provider, No Known  [place] [address]      For suture removal in 5-7 days    Cindy Edwards MD  [address]  [place] [address]  [phone]    Schedule an appointment as soon as possible for a visit       [facility] Emergency Department  [address] [place] [id number] [phone]  Go to   If symptoms worsen         Medication List      No changes were made to your prescriptions during this visit.                                     MDM  Number of Diagnoses or Management Options  Contusion of left knee, initial encounter: new and requires workup  Contusion of left periocular region, initial encounter: new and requires workup  Fall, initial encounter: new and requires workup  Laceration of left eyebrow, initial encounter: new and requires workup     Amount and/or Complexity of Data Reviewed  Clinical lab tests: reviewed  Tests in the radiology section of CPT®: reviewed  Tests in the medicine section of CPT®: reviewed    Risk of Complications, Morbidity, and/or Mortality  Presenting problems: moderate  Diagnostic procedures: moderate  Management options:  moderate    Patient Progress  Patient progress: improved      Final diagnoses:   Fall, initial encounter   Laceration of left eyebrow, initial encounter   Contusion of left knee, initial encounter   Contusion of left periocular region, initial encounter            Alton Wong PA  12/15/20 2000

## 2020-12-18 LAB
QT INTERVAL: 412 MS
QTC INTERVAL: 447 MS

## 2021-03-15 ENCOUNTER — OFFICE VISIT (OUTPATIENT)
Dept: INTERNAL MEDICINE | Facility: CLINIC | Age: 81
End: 2021-03-15

## 2021-03-15 VITALS
TEMPERATURE: 96.6 F | HEART RATE: 73 BPM | RESPIRATION RATE: 16 BRPM | OXYGEN SATURATION: 96 % | HEIGHT: 66 IN | BODY MASS INDEX: 25.49 KG/M2 | DIASTOLIC BLOOD PRESSURE: 88 MMHG | SYSTOLIC BLOOD PRESSURE: 146 MMHG | WEIGHT: 158.6 LBS

## 2021-03-15 DIAGNOSIS — R10.11 RUQ PAIN: ICD-10-CM

## 2021-03-15 DIAGNOSIS — Z12.11 COLON CANCER SCREENING: ICD-10-CM

## 2021-03-15 DIAGNOSIS — Z78.0 POSTMENOPAUSAL: Chronic | ICD-10-CM

## 2021-03-15 DIAGNOSIS — B35.1 ONYCHOMYCOSIS: Primary | Chronic | ICD-10-CM

## 2021-03-15 DIAGNOSIS — E55.9 VITAMIN D DEFICIENCY: ICD-10-CM

## 2021-03-15 DIAGNOSIS — Z85.828 HISTORY OF BASAL CELL CARCINOMA: Chronic | ICD-10-CM

## 2021-03-15 DIAGNOSIS — Z79.899 HIGH RISK MEDICATIONS (NOT ANTICOAGULANTS) LONG-TERM USE: ICD-10-CM

## 2021-03-15 DIAGNOSIS — Z13.220 SCREENING CHOLESTEROL LEVEL: ICD-10-CM

## 2021-03-15 PROCEDURE — 99204 OFFICE O/P NEW MOD 45 MIN: CPT | Performed by: INTERNAL MEDICINE

## 2021-03-15 RX ORDER — LANOLIN ALCOHOL/MO/W.PET/CERES
1000 CREAM (GRAM) TOPICAL DAILY
COMMUNITY
End: 2021-09-22

## 2021-03-15 RX ORDER — OMEPRAZOLE 20 MG/1
20 CAPSULE, DELAYED RELEASE ORAL AS NEEDED
COMMUNITY
End: 2021-09-22

## 2021-03-15 NOTE — PROGRESS NOTES
Chief Complaint  Establish Care (New patient/Check up )    Subjective          Maria Luisa Wilson presents to [facility]  History of Present Illness    New pt here to establish. She had been seeing Dr Robin who retired. H/o:    Osteoarthritis - she is on celebrex prn -does not take regularly, uses about every few weeks.  Does not need refill today  Her son is an orthopedic surgeon in [place] and helps manage her arthritis as well    GERD - uses prescription prilosec just as needed and does not have to take every day    bcc - has had some on neck, arm and required Mohs surgery in the past.  She had seen a dermatologist at [facility].  She has noticed a few scaly places on her face    Urinary incontinence-she sees Dr Domingo and gets botox q3m.  She is status post hysterectomy and no longer gets Pap smears    Elevated BP - has been a little high in the past but never was on BP meds  Has not checked recently but does have a blood pressure cuff at home    Left thumb nail-laterally she has had some discoloration for months and has been using vicks salve which helps but she would like to take an oral medication.  She has not been on Lamisil    RUQ pain-occasionally she will feels mild discomfort in right upper quadrant.  Not food related.  No associated nausea.  This has been going on for several months      Current Outpatient Medications:   •  Biotin 01555 MCG tablet, Take  by mouth., Disp: , Rfl:   •  calcium carbonate (OS-JATINDER) 600 MG tablet, Take 600 mg by mouth Daily., Disp: , Rfl:   •  celecoxib (CeleBREX) 100 MG capsule, Take 100 mg by mouth 2 (Two) Times a Day As Needed for Mild Pain ., Disp: , Rfl:   •  Cholecalciferol (VITAMIN D3) 5000 units capsule capsule, Take 5,000 Units by mouth Daily., Disp: , Rfl:   •  glucosamine-chondroitin 500-400 MG capsule capsule, Take  by mouth 3 (Three) Times a Day With Meals., Disp: , Rfl:   •  omeprazole (priLOSEC) 20 MG capsule, Take 20 mg by mouth  "As Needed., Disp: , Rfl:   •  vitamin B-12 (CYANOCOBALAMIN) 1000 MCG tablet, Take 1,000 mcg by mouth Daily., Disp: , Rfl:       Objective   Vital Signs:   /88   Pulse 73   Temp 96.6 °F (35.9 °C) (Infrared)   Resp 16   Ht 167.6 cm (66\")   Wt 71.9 kg (158 lb 9.6 oz)   SpO2 96%   BMI 25.60 kg/m²       Physical exam  Constitutional: oriented to person, place, and time.  well-developed and well-nourished. No distress.   HENT:   Head: Normocephalic and atraumatic.   Eyes: Conjunctivae and EOM are normal.   Cardiovascular: Normal rate, regular rhythm and normal heart sounds.  Exam reveals no gallop and no friction rub.    No murmur heard.  Abdomen: Soft, nontender nondistended, no right upper quadrant tenderness today  Pulmonary/Chest: Effort normal and breath sounds normal. No respiratory distress.   no wheezes.   Neurological:  alert and oriented to person, place, and time.   Skin: Skin is warm and dry. not diaphoretic.  She has 2 slightly scaly places on face-1 above the lip and one on left cheek.  Left thumbnail with slight discoloration laterally  Psychiatric:  normal mood and affect. behavior is normal. Judgment and thought content normal.      Recheck /80      Result Review :   The following data was reviewed by: Cindy Edwards MD on 03/15/2021:      Data reviewed: Lab results from [facility] from 2019 on care everywhere          Assessment and Plan    Diagnoses and all orders for this visit:    1. Onychomycosis (Primary)  Comments:  If liver labs are normal, we can send in Lamisil to take for 6 weeks.  Discussed with patient that may take several months longer to see improvement  Orders:  -     Comprehensive Metabolic Panel; Future  -     Comprehensive Metabolic Panel    2. RUQ pain  Comments:  Discussed getting a right upper quadrant ultrasound.  She is status post CCY  If pain worsens, she will let me know and we can order  Orders:  -     Comprehensive Metabolic Panel; Future  -     " Comprehensive Metabolic Panel    3. High risk medications (not anticoagulants) long-term use  -     CBC (No Diff); Future  -     TSH Rfx On Abnormal To Free T4; Future  -     Comprehensive Metabolic Panel; Future  -     Comprehensive Metabolic Panel  -     TSH Rfx On Abnormal To Free T4  -     CBC (No Diff)    4. Vitamin D deficiency  Comments:  Check levels today  Orders:  -     Vitamin D 25 Hydroxy; Future  -     Vitamin D 25 Hydroxy    5. Screening cholesterol level  Comments:  Check levels today  Orders:  -     Lipid Panel; Future  -     Lipid Panel    6. Postmenopausal  Comments:  We will order DEXA  Orders:  -     DEXA Bone Density Axial; Future    7. Colon cancer screening  Comments:  Discussed benefits/risks of screening at age 81.  She would like to do  Cologuard. We did discuss if this is  positive we would recommend a colonoscopy  Orders:  -     Cologuard - Stool, Per Rectum; Future    8. History of basal cell carcinoma  Comments:  May have a few AK's on her face and advise getting in with her dermatologist for treatment      I spent 50 minutes caring for Maria Luisa on this date of service. This time includes time spent by me in the following activities:obtaining and/or reviewing a separately obtained history, performing a medically appropriate examination and/or evaluation , counseling and educating the patient/family/caregiver, ordering medications, tests, or procedures and documenting information in the medical record  Follow Up   Return in about 6 months (around 9/15/2021) for Medicare Wellness.  Patient was given instructions and counseling regarding her condition or for health maintenance advice. Please see specific information pulled into the AVS if appropriate.     Prev:  She does not want to get COVID vaccine  She declines flu vaccine  She had pneumonia vaccine  Did have colonoscopy in the past many years ago, and had fecal occult test which was -2 years ago-Cologuard today  Addy due in 10/21 ([facility])

## 2021-03-17 LAB
25(OH)D3+25(OH)D2 SERPL-MCNC: 48.4 NG/ML (ref 30–100)
ALBUMIN SERPL-MCNC: 4.3 G/DL (ref 3.5–5.2)
ALBUMIN/GLOB SERPL: 1.7 G/DL
ALP SERPL-CCNC: 67 U/L (ref 39–117)
ALT SERPL-CCNC: 21 U/L (ref 1–33)
AST SERPL-CCNC: 30 U/L (ref 1–32)
BILIRUB SERPL-MCNC: 0.3 MG/DL (ref 0–1.2)
BUN SERPL-MCNC: 16 MG/DL (ref 8–23)
BUN/CREAT SERPL: 18 (ref 7–25)
CALCIUM SERPL-MCNC: 9.7 MG/DL (ref 8.6–10.5)
CHLORIDE SERPL-SCNC: 106 MMOL/L (ref 98–107)
CHOLEST SERPL-MCNC: 247 MG/DL (ref 0–200)
CO2 SERPL-SCNC: 28.2 MMOL/L (ref 22–29)
CREAT SERPL-MCNC: 0.89 MG/DL (ref 0.57–1)
ERYTHROCYTE [DISTWIDTH] IN BLOOD BY AUTOMATED COUNT: 12.4 % (ref 12.3–15.4)
GLOBULIN SER CALC-MCNC: 2.5 GM/DL
GLUCOSE SERPL-MCNC: 88 MG/DL (ref 65–99)
HCT VFR BLD AUTO: 41.6 % (ref 34–46.6)
HDLC SERPL-MCNC: 65 MG/DL (ref 40–60)
HGB BLD-MCNC: 13.7 G/DL (ref 12–15.9)
LDLC SERPL CALC-MCNC: 156 MG/DL (ref 0–100)
MCH RBC QN AUTO: 30.4 PG (ref 26.6–33)
MCHC RBC AUTO-ENTMCNC: 32.9 G/DL (ref 31.5–35.7)
MCV RBC AUTO: 92.2 FL (ref 79–97)
PLATELET # BLD AUTO: 253 10*3/MM3 (ref 140–450)
POTASSIUM SERPL-SCNC: 4.6 MMOL/L (ref 3.5–5.2)
PROT SERPL-MCNC: 6.8 G/DL (ref 6–8.5)
RBC # BLD AUTO: 4.51 10*6/MM3 (ref 3.77–5.28)
SODIUM SERPL-SCNC: 144 MMOL/L (ref 136–145)
TRIGL SERPL-MCNC: 144 MG/DL (ref 0–150)
TSH SERPL DL<=0.005 MIU/L-ACNC: 2.54 UIU/ML (ref 0.27–4.2)
VLDLC SERPL CALC-MCNC: 26 MG/DL (ref 5–40)
WBC # BLD AUTO: 4.68 10*3/MM3 (ref 3.4–10.8)

## 2021-03-19 RX ORDER — TERBINAFINE HYDROCHLORIDE 250 MG/1
250 TABLET ORAL DAILY
Qty: 42 TABLET | Refills: 0 | Status: SHIPPED | OUTPATIENT
Start: 2021-03-19 | End: 2021-06-01

## 2021-06-01 ENCOUNTER — OFFICE VISIT (OUTPATIENT)
Dept: INTERNAL MEDICINE | Facility: CLINIC | Age: 81
End: 2021-06-01

## 2021-06-01 VITALS
RESPIRATION RATE: 20 BRPM | HEART RATE: 81 BPM | WEIGHT: 161 LBS | TEMPERATURE: 98 F | BODY MASS INDEX: 25.88 KG/M2 | HEIGHT: 66 IN | DIASTOLIC BLOOD PRESSURE: 82 MMHG | OXYGEN SATURATION: 98 % | SYSTOLIC BLOOD PRESSURE: 156 MMHG

## 2021-06-01 DIAGNOSIS — H60.502 ACUTE OTITIS EXTERNA OF LEFT EAR, UNSPECIFIED TYPE: ICD-10-CM

## 2021-06-01 DIAGNOSIS — I10 ESSENTIAL HYPERTENSION: Primary | ICD-10-CM

## 2021-06-01 DIAGNOSIS — J06.9 VIRAL URI WITH COUGH: ICD-10-CM

## 2021-06-01 PROCEDURE — 99214 OFFICE O/P EST MOD 30 MIN: CPT | Performed by: PHYSICIAN ASSISTANT

## 2021-06-01 RX ORDER — LISINOPRIL 10 MG/1
10 TABLET ORAL DAILY
Qty: 30 TABLET | Refills: 1 | Status: SHIPPED | OUTPATIENT
Start: 2021-06-01 | End: 2021-09-22 | Stop reason: SINTOL

## 2021-06-01 RX ORDER — LISINOPRIL 10 MG/1
10 TABLET ORAL DAILY
Qty: 90 TABLET | OUTPATIENT
Start: 2021-06-01

## 2021-06-01 RX ORDER — BENZONATATE 100 MG/1
100-200 CAPSULE ORAL EVERY 8 HOURS PRN
Qty: 30 CAPSULE | Refills: 0 | Status: SHIPPED | OUTPATIENT
Start: 2021-06-01 | End: 2021-09-22

## 2021-06-01 RX ORDER — OFLOXACIN 3 MG/ML
5 SOLUTION AURICULAR (OTIC) 2 TIMES DAILY
Qty: 5 ML | Refills: 0 | Status: SHIPPED | OUTPATIENT
Start: 2021-06-01 | End: 2021-06-08

## 2021-06-01 RX ORDER — MULTIPLE VITAMINS W/ MINERALS TAB 9MG-400MCG
1 TAB ORAL DAILY
COMMUNITY

## 2021-06-01 NOTE — PROGRESS NOTES
Chief Complaint  Hypertension and Earache    Subjective          History of Present Illness  Maria Luisa Wilson presents to [facility] for   HTN:  Is taking supplements to help with her BP, taking coq10, mag, potassium.  No Chest pains, has had heart beat in her ears the last few nights and her BP was reading at 200 on the top with her wrist cuff. No palpitations, no tachyardia. No edema, no SOA, dizziness, diaphoresis, exercise intolerance. Thinks her supplements are not enough to help with BP and plans on trying a low salt diet.     Otalgia:  Has some ear pain lately, worse on the left.  She feels like the ear canal itself is irritated and if she puts her finger in there it feels like there are needles poking her ear.  No trouble hearing.  No pain with swallowing.  Has had a cold for the last few weeks that is resolving but still has a cough       Review of Systems   Constitutional: Negative for fever and unexpected weight loss.   HENT: Positive for ear pain and postnasal drip. Negative for congestion, rhinorrhea, sinus pressure, sneezing and sore throat.    Respiratory: Positive for cough. Negative for shortness of breath and wheezing.    Cardiovascular: Negative for chest pain and palpitations.       The following portions of the patient's history were reviewed and updated as appropriate: allergies, current medications, past family history, past medical history, past social history, past surgical history and problem list.  No Known Allergies  Current Outpatient Medications on File Prior to Visit   Medication Sig Dispense Refill   • Biotin 94737 MCG tablet Take  by mouth.     • calcium carbonate (OS-JATINDER) 600 MG tablet Take 600 mg by mouth Daily.     • celecoxib (CeleBREX) 100 MG capsule Take 100 mg by mouth 2 (Two) Times a Day As Needed for Mild Pain .     • Cholecalciferol (VITAMIN D3) 5000 units capsule capsule Take 5,000 Units by mouth Daily.     • glucosamine-chondroitin 500-400 MG  "capsule capsule Take  by mouth 3 (Three) Times a Day With Meals.     • multivitamin with minerals (SENIOR MULTIVITAMIN PLUS PO) Take 1 tablet by mouth Daily.     • omeprazole (priLOSEC) 20 MG capsule Take 20 mg by mouth As Needed.     • vitamin B-12 (CYANOCOBALAMIN) 1000 MCG tablet Take 1,000 mcg by mouth Daily.     • chlorhexidine (PERIDEX) 0.12 % solution        No current facility-administered medications on file prior to visit.     New Medications Ordered This Visit   Medications   • ofloxacin (FLOXIN) 0.3 % otic solution     Sig: Administer 5 drops into both ears 2 (Two) Times a Day for 7 days.     Dispense:  5 mL     Refill:  0   • lisinopril (PRINIVIL,ZESTRIL) 10 MG tablet     Sig: Take 1 tablet by mouth Daily.     Dispense:  30 tablet     Refill:  1   • benzonatate (Tessalon Perles) 100 MG capsule     Sig: Take 1-2 capsules by mouth Every 8 (Eight) Hours As Needed for Cough.     Dispense:  30 capsule     Refill:  0       Social History     Tobacco Use   Smoking Status Never Smoker   Smokeless Tobacco Never Used        Objective   Vital Signs:   Vitals:    06/01/21 0924 06/01/21 0955   BP: 152/86 156/82   Pulse: 81    Resp: 20    Temp: 98 °F (36.7 °C)    TempSrc: Temporal    SpO2: 98%    Weight: 73 kg (161 lb)    Height: 167.6 cm (66\")    PainSc: 0-No pain       Physical Exam  Vitals reviewed.   Constitutional:       General: She is not in acute distress.     Appearance: Normal appearance.   HENT:      Head: Normocephalic and atraumatic.      Right Ear: Tympanic membrane, ear canal and external ear normal.      Left Ear: Tympanic membrane and external ear normal. There is impacted cerumen.      Ears:      Comments: lft canal with eryth and tenderness  Eyes:      General: No scleral icterus.     Extraocular Movements: Extraocular movements intact.      Conjunctiva/sclera: Conjunctivae normal.   Cardiovascular:      Rate and Rhythm: Normal rate and regular rhythm.      Heart sounds: Normal heart sounds. No " murmur heard.     Pulmonary:      Effort: Pulmonary effort is normal. No respiratory distress.      Breath sounds: Normal breath sounds. No stridor. No wheezing or rhonchi.   Musculoskeletal:      Cervical back: Normal range of motion and neck supple.   Skin:     General: Skin is warm and dry.      Coloration: Skin is not jaundiced.   Neurological:      General: No focal deficit present.      Mental Status: She is alert and oriented to person, place, and time.      Gait: Gait normal.   Psychiatric:         Mood and Affect: Mood normal.         Behavior: Behavior normal.          Result Review :                   Assessment and Plan    Diagnoses and all orders for this visit:    1. Essential hypertension (Primary)  Assessment & Plan:  Hypertension is newly identified.  Dietary sodium restriction.  Weight loss.  Regular aerobic exercise.  Medication changes per orders.  Blood pressure will be reassessed in 4 weeks. Will start lisionpril 10mg, she can cont supplements and start low salt diet. Adv if BP becomes low with her supplements, low salt diet, we can d/c lisinopril (pt hesitant to start BP med). Check BP at home as she has been 1-2 times a day.   Wrist cuff use was incorrect, we corrected her technique and checked cuff for accuracy in office.     Orders:  -     lisinopril (PRINIVIL,ZESTRIL) 10 MG tablet; Take 1 tablet by mouth Daily.  Dispense: 30 tablet; Refill: 1    2. Viral URI with cough  Assessment & Plan:  rx tessalon, f/u if sx worsen or persist    Orders:  -     benzonatate (Tessalon Perles) 100 MG capsule; Take 1-2 capsules by mouth Every 8 (Eight) Hours As Needed for Cough.  Dispense: 30 capsule; Refill: 0    3. Acute otitis externa of left ear, unspecified type  Assessment & Plan:  Treat with abx ear drops for canal irritation. F/u if sx persist or worsen. F/u with ENT     Orders:  -     ofloxacin (FLOXIN) 0.3 % otic solution; Administer 5 drops into both ears 2 (Two) Times a Day for 7 days.   Dispense: 5 mL; Refill: 0        Follow Up   Return in about 4 weeks (around 6/29/2021).    Follow up if symptoms worsen or persist or has new or concerning symptoms, go to ER for severe symptoms.   Reviewed common medication effects and side effects and advised to report side effects immediately, the patient expressed good understanding.  Encouraged medication compliance and the importance of keeping scheduled follow up appointments with me and any other providers  If labs or images are ordered we will contact you with the results within the next week.  If you have not heard from us after a week please call our office to inquire about the results.   Patient was given instructions and counseling regarding her condition or for health maintenance advice. Please see specific information pulled into the AVS if appropriate.     Mayra Ritchie PA-C    * Please note that portions of this note may have been completed with a voice recognition program. Efforts were made to edit the dictation but occasionally words are erroneously transcribed.

## 2021-06-02 PROBLEM — H60.502 ACUTE OTITIS EXTERNA OF LEFT EAR: Status: ACTIVE | Noted: 2021-06-02

## 2021-06-02 PROBLEM — I10 ESSENTIAL HYPERTENSION: Status: ACTIVE | Noted: 2021-06-02

## 2021-06-02 PROBLEM — H92.02 OTALGIA, LEFT: Status: ACTIVE | Noted: 2021-06-02

## 2021-06-02 PROBLEM — H92.02 OTALGIA, LEFT: Status: RESOLVED | Noted: 2021-06-02 | Resolved: 2021-06-02

## 2021-06-02 RX ORDER — CHLORHEXIDINE GLUCONATE 0.12 MG/ML
RINSE ORAL
COMMUNITY
Start: 2021-03-29 | End: 2021-09-22

## 2021-06-02 NOTE — ASSESSMENT & PLAN NOTE
Hypertension is newly identified.  Dietary sodium restriction.  Weight loss.  Regular aerobic exercise.  Medication changes per orders.  Blood pressure will be reassessed in 4 weeks. Will start lisionpril 10mg, she can cont supplements and start low salt diet. Adv if BP becomes low with her supplements, low salt diet, we can d/c lisinopril (pt hesitant to start BP med). Check BP at home as she has been 1-2 times a day.   Wrist cuff use was incorrect, we corrected her technique and checked cuff for accuracy in office.

## 2021-06-14 ENCOUNTER — CLINICAL SUPPORT (OUTPATIENT)
Dept: INTERNAL MEDICINE | Facility: CLINIC | Age: 81
End: 2021-06-14

## 2021-06-14 VITALS — SYSTOLIC BLOOD PRESSURE: 144 MMHG | DIASTOLIC BLOOD PRESSURE: 78 MMHG

## 2021-09-18 NOTE — PROGRESS NOTES
History of Present Illness     Here for medicare wellness exam and physical. No hospitalizations in last year and pt feels health is stable    PHQ-2-0  Falls: fell in 12/20- tripped on her shoe.  No other falls and she feels like her balance is good overall  Memory:coming up w/ names is difficult but otherwise no problems. Tries to do crosswords and stay mentally active  Pain Level: 0  Living will: Patient thinks she has but is not sure where it is  Specialists:  Tim - Nadir   GYN: Chayo  ENT: Arsh  Diet: Healthy overall and multiple supplements (see list)  Exercise: tries to walk some, and does have some equipment at home but has not been doing regularly.  She does feel like she would benefit from some formal exercise    OA-no longer taking the Celebrex but is using glucosamine and feels like she is managing fairly well    Hypertension-lisinopril was started 3 months ago but she had cough, dizziness and fatigue with it.she stopped it and has been trying to follow a low salt diet. She has also been taking a blood pressure support supplement that has garlic in it.  She does feel like blood pressures have improved some in the 140/90 range.    She felt like she had covid and got tested at [facility] but states she never got results about 3months ago. She wants antibodies checked.  She has not had her vaccine yet      Current Outpatient Medications on File Prior to Visit   Medication Sig Dispense Refill   • ascorbic acid (VITAMIN C) 500 MG tablet Take 500 mg by mouth Daily.     • Biotin 50322 MCG tablet Take  by mouth.     • calcium carbonate (OS-JATINDER) 600 MG tablet Take 600 mg by mouth Daily.     • Cholecalciferol (VITAMIN D3) 5000 units capsule capsule Take 5,000 Units by mouth Daily.     • glucosamine-chondroitin 500-400 MG capsule capsule Take  by mouth 3 (Three) Times a Day With Meals.     • loratadine (CLARITIN) 10 MG tablet Take 10 mg by mouth Daily.     • multivitamin with minerals (SENIOR MULTIVITAMIN PLUS PO) Take  1 tablet by mouth Daily.     • Unable to find 1 each 1 (One) Time. Blood pressure support w/ [place] and hibiscus     • [DISCONTINUED] vitamin B-12 (CYANOCOBALAMIN) 1000 MCG tablet Take 1,000 mcg by mouth Daily.     • acyclovir (ZOVIRAX) 200 MG capsule 1 capsule Daily As Needed.     • [DISCONTINUED] benzonatate (Tessalon Perles) 100 MG capsule Take 1-2 capsules by mouth Every 8 (Eight) Hours As Needed for Cough. 30 capsule 0   • [DISCONTINUED] celecoxib (CeleBREX) 100 MG capsule Take 100 mg by mouth 2 (Two) Times a Day As Needed for Mild Pain .     • [DISCONTINUED] chlorhexidine (PERIDEX) 0.12 % solution      • [DISCONTINUED] lisinopril (PRINIVIL,ZESTRIL) 10 MG tablet Take 1 tablet by mouth Daily. 30 tablet 1   • [DISCONTINUED] omeprazole (priLOSEC) 20 MG capsule Take 20 mg by mouth As Needed.       No current facility-administered medications on file prior to visit.       The following portions of the patient's history were reviewed and updated as appropriate: allergies, current medications, past family history, past medical history, past social history, past surgical history and problem list.    Review of Systems   Constitutional: Negative for activity change, appetite change, fever, unexpected weight gain and unexpected weight loss.   HENT: Negative.    Eyes: Negative.    Respiratory: Negative for shortness of breath and wheezing.    Cardiovascular: Negative for chest pain, palpitations and leg swelling.   Gastrointestinal: Negative.    Endocrine: Negative.    Genitourinary: Negative for difficulty urinating and dysuria.   Skin: Negative.    Allergic/Immunologic: Negative for immunocompromised state.   Neurological: Negative for seizures, speech difficulty, memory problem and confusion.   Hematological: Does not bruise/bleed easily.   Psychiatric/Behavioral: Negative for agitation.         Objective    Vitals:    09/22/21 1309   BP: 142/92   Pulse: 69   Temp: 96.9 °F (36.1 °C)   SpO2: 99%     Physical Exam    Physical Exam  Vitals and nursing note reviewed.   Constitutional:       General: She is not in acute distress.     Appearance: She is well-developed. She is not diaphoretic.   HENT:      Head: Normocephalic and atraumatic.      Right Ear: External ear normal.      Left Ear: External ear normal.      Nose: Nose normal.      Mouth/Throat:      Pharynx: No oropharyngeal exudate.   Eyes:      General: No scleral icterus.        Right eye: No discharge.         Left eye: No discharge.      Conjunctiva/sclera: Conjunctivae normal.      Pupils: Pupils are equal, round, and reactive to light.   Neck:      Thyroid: No thyromegaly.   Cardiovascular:      Rate and Rhythm: Normal rate and regular rhythm.      Heart sounds: Normal heart sounds. No murmur heard.   No friction rub. No gallop.    Pulmonary:      Effort: Pulmonary effort is normal. No respiratory distress.      Breath sounds: Normal breath sounds. No wheezing or rales.   Chest:      Breasts:         Right: No mass, nipple discharge, skin change or tenderness.         Left: No mass, nipple discharge, skin change or tenderness.   Abdominal:      General: Bowel sounds are normal. There is no distension.      Palpations: Abdomen is soft. There is no mass.      Tenderness: There is no abdominal tenderness. There is no guarding or rebound.   Musculoskeletal:         General: No deformity. Normal range of motion.      Cervical back: Normal range of motion and neck supple.   Lymphadenopathy:      Cervical: No cervical adenopathy.   Skin:     General: Skin is warm and dry.      Coloration: Skin is not pale.      Findings: No erythema or rash.   Neurological:      Mental Status: She is alert and oriented to person, place, and time.      Coordination: Coordination normal.      Deep Tendon Reflexes: Reflexes normal.   Psychiatric:         Behavior: Behavior normal.         Thought Content: Thought content normal.         Judgment: Judgment normal.            Assessment/Plan    Diagnoses and all orders for this visit:    1. Routine general medical examination at a health care facility (Primary)  -     POC Urinalysis Dipstick, Automated  Regular exercise/healthy diet. BSE q month. Sunscreen use encouraged. caclium intake reviewed.  Living will -form given to patient in case she cannot find her old one and she will bring us copy when completed.  Addy due 10/21- she will schedule at [facility].  Colon - had cologuard in 3/21 which was negative.  We will not do any further colon cancer screening because of age  Pneumovax- had in '19  covid- she declines vaccines.  Encouraged her to get  DT due 12/30  DEXA due now-we will schedule  Shingles- she beleieves she had the shingrix already  Does not need paps since age 65 or older and has had normal paps in past  We did fasting labs in March so we will plan to recheck next year.    2. Medicare annual wellness visit, subsequent    3. Essential hypertension-still slightly high.  She will continue to try to watch diet and cut back on sodium.  She will try to exercise more regularly and lose a few pounds    4.  History of Upper respiratory tract infection, unspecified type several months ago-patient would like to know if it was Covid and will request antibody test.  Discussed with patient that she still would need the Covid vaccine whether she has antibodies or not but she still requests the test  -     SARS-CoV-2 Antibodies (Roche); Future  -     SARS-CoV-2 Antibodies (Roche)

## 2021-09-22 ENCOUNTER — LAB (OUTPATIENT)
Dept: LAB | Facility: HOSPITAL | Age: 81
End: 2021-09-22

## 2021-09-22 ENCOUNTER — OFFICE VISIT (OUTPATIENT)
Dept: INTERNAL MEDICINE | Facility: CLINIC | Age: 81
End: 2021-09-22

## 2021-09-22 VITALS
SYSTOLIC BLOOD PRESSURE: 142 MMHG | BODY MASS INDEX: 25.36 KG/M2 | HEART RATE: 69 BPM | HEIGHT: 66 IN | DIASTOLIC BLOOD PRESSURE: 92 MMHG | WEIGHT: 157.8 LBS | TEMPERATURE: 96.9 F | OXYGEN SATURATION: 99 %

## 2021-09-22 DIAGNOSIS — Z00.00 ROUTINE GENERAL MEDICAL EXAMINATION AT A HEALTH CARE FACILITY: Primary | ICD-10-CM

## 2021-09-22 DIAGNOSIS — Z78.0 POSTMENOPAUSAL: ICD-10-CM

## 2021-09-22 DIAGNOSIS — J06.9 UPPER RESPIRATORY TRACT INFECTION, UNSPECIFIED TYPE: ICD-10-CM

## 2021-09-22 DIAGNOSIS — Z00.00 MEDICARE ANNUAL WELLNESS VISIT, SUBSEQUENT: ICD-10-CM

## 2021-09-22 DIAGNOSIS — I10 ESSENTIAL HYPERTENSION: ICD-10-CM

## 2021-09-22 LAB
BILIRUB BLD-MCNC: NEGATIVE MG/DL
CLARITY, POC: CLEAR
COLOR UR: YELLOW
GLUCOSE UR STRIP-MCNC: NEGATIVE MG/DL
KETONES UR QL: NEGATIVE
LEUKOCYTE EST, POC: NEGATIVE
NITRITE UR-MCNC: NEGATIVE MG/ML
PH UR: 5.5 [PH] (ref 5–8)
PROT UR STRIP-MCNC: NEGATIVE MG/DL
RBC # UR STRIP: ABNORMAL /UL
SP GR UR: 1.02 (ref 1–1.03)
UROBILINOGEN UR QL: NORMAL

## 2021-09-22 PROCEDURE — 1160F RVW MEDS BY RX/DR IN RCRD: CPT | Performed by: INTERNAL MEDICINE

## 2021-09-22 PROCEDURE — G0439 PPPS, SUBSEQ VISIT: HCPCS | Performed by: INTERNAL MEDICINE

## 2021-09-22 PROCEDURE — 1170F FXNL STATUS ASSESSED: CPT | Performed by: INTERNAL MEDICINE

## 2021-09-22 PROCEDURE — 99397 PER PM REEVAL EST PAT 65+ YR: CPT | Performed by: INTERNAL MEDICINE

## 2021-09-22 PROCEDURE — 81003 URINALYSIS AUTO W/O SCOPE: CPT | Performed by: INTERNAL MEDICINE

## 2021-09-22 PROCEDURE — 96160 PT-FOCUSED HLTH RISK ASSMT: CPT | Performed by: INTERNAL MEDICINE

## 2021-09-22 PROCEDURE — 1126F AMNT PAIN NOTED NONE PRSNT: CPT | Performed by: INTERNAL MEDICINE

## 2021-09-22 RX ORDER — LORATADINE 10 MG/1
10 TABLET ORAL DAILY
COMMUNITY

## 2021-09-22 RX ORDER — ACYCLOVIR 200 MG/1
1 CAPSULE ORAL DAILY PRN
COMMUNITY

## 2021-09-22 RX ORDER — ASCORBIC ACID 500 MG
500 TABLET ORAL DAILY
COMMUNITY

## 2021-09-23 ENCOUNTER — TELEPHONE (OUTPATIENT)
Dept: INTERNAL MEDICINE | Facility: CLINIC | Age: 81
End: 2021-09-23

## 2021-09-23 LAB
ALBUMIN SERPL-MCNC: 4.4 G/DL (ref 3.7–4.7)
ALBUMIN/GLOB SERPL: 1.8 {RATIO} (ref 1.2–2.2)
ALP SERPL-CCNC: 63 IU/L (ref 44–121)
ALT SERPL-CCNC: 20 IU/L (ref 0–32)
AST SERPL-CCNC: 28 IU/L (ref 0–40)
BILIRUB SERPL-MCNC: 0.3 MG/DL (ref 0–1.2)
BUN SERPL-MCNC: 18 MG/DL (ref 8–27)
BUN/CREAT SERPL: 23 (ref 12–28)
CALCIUM SERPL-MCNC: 9.5 MG/DL (ref 8.7–10.3)
CHLORIDE SERPL-SCNC: 104 MMOL/L (ref 96–106)
CO2 SERPL-SCNC: 24 MMOL/L (ref 20–29)
CREAT SERPL-MCNC: 0.8 MG/DL (ref 0.57–1)
GLOBULIN SER CALC-MCNC: 2.4 G/DL (ref 1.5–4.5)
GLUCOSE SERPL-MCNC: 87 MG/DL (ref 65–99)
Lab: NORMAL
POTASSIUM SERPL-SCNC: 4.8 MMOL/L (ref 3.5–5.2)
PROT SERPL-MCNC: 6.8 G/DL (ref 6–8.5)
SARS-COV-2 AB SERPL QL IA: NEGATIVE
SODIUM SERPL-SCNC: 141 MMOL/L (ref 134–144)

## 2021-09-23 NOTE — TELEPHONE ENCOUNTER
----- Message from Cindy Edwards MD sent at 9/23/2021 11:19 AM EDT -----  Can you let her know, her antibodies are negative. Would highly recommend getting a covid vaccine

## 2021-09-24 NOTE — TELEPHONE ENCOUNTER
Called and spoke to patient, informed her that COVID antibodies are negative and that Dr. Edwards recommends getting the vaccine. She verbalized understanding and had no further questions.